# Patient Record
Sex: FEMALE | Race: WHITE | NOT HISPANIC OR LATINO | Employment: FULL TIME | URBAN - METROPOLITAN AREA
[De-identification: names, ages, dates, MRNs, and addresses within clinical notes are randomized per-mention and may not be internally consistent; named-entity substitution may affect disease eponyms.]

---

## 2019-10-06 ENCOUNTER — HOSPITAL ENCOUNTER (EMERGENCY)
Facility: HOSPITAL | Age: 29
Discharge: HOME | End: 2019-10-06
Attending: EMERGENCY MEDICINE | Admitting: EMERGENCY MEDICINE

## 2019-10-06 VITALS
HEIGHT: 67 IN | SYSTOLIC BLOOD PRESSURE: 121 MMHG | BODY MASS INDEX: 29.82 KG/M2 | HEART RATE: 96 BPM | WEIGHT: 190 LBS | RESPIRATION RATE: 16 BRPM | OXYGEN SATURATION: 99 % | DIASTOLIC BLOOD PRESSURE: 71 MMHG | TEMPERATURE: 97.9 F

## 2019-10-06 DIAGNOSIS — T50.901A ACCIDENTAL DRUG INGESTION, INITIAL ENCOUNTER: Primary | ICD-10-CM

## 2019-10-06 PROCEDURE — 99282 EMERGENCY DEPT VISIT SF MDM: CPT | Performed by: EMERGENCY MEDICINE

## 2019-10-06 PROCEDURE — 99284 EMERGENCY DEPT VISIT MOD MDM: CPT

## 2019-10-06 RX ADMIN — ACTIVATED CHARCOAL 50 G: 208 SUSPENSION ORAL at 21:00

## 2019-10-07 NOTE — ED PROVIDER NOTES
Subjective   History of Present Illness  History of Present Illness    Chief complaint: Accidental ingestion    Location: Home    Quality/Severity: Patient took 110 mg medroxyprogesterone by accident.    Timing/Onset/Duration: 8 PM    Modifying Factors: Nothing makes it better or worse    Associated Symptoms: No abdominal pain.  No bleeding.    Narrative: This 29-year-old female who is 4.5 weeks pregnant accidentally took 1 of her 10 mg medroxyprogesterone tonight at 8 PM.    OB/GYN: Dr. Hawk at [facility].      Review of Systems   Gastrointestinal: Negative for abdominal pain and nausea.   Genitourinary: Negative for vaginal bleeding.        Medication List      You have not been prescribed any medications.       No past medical history on file.    Allergies not on file    No past surgical history on file.    No family history on file.             Objective   Physical Exam   Constitutional: She is oriented to person, place, and time. She appears well-developed and well-nourished. She appears distressed (Patient appears anxious.).   ED Triage Vitals (10/06/19 2054)  Temp: 97.9 °F (36.6 °C)  Heart Rate: (!) 122  Resp: 20  BP: 160/92  SpO2: 100 %  Temp src: n/a  Heart Rate Source: n/a  Patient Position: n/a  BP Location: n/a  FiO2 (%): n/a    The patient's vitals were reviewed by me.  Unless otherwise noted they are within normal limits.  The patient is hypertensive with a blood pressure 160/92.  She is tachycardic with heart rate of 122.  She is anxious.     Abdominal: Soft. Bowel sounds are normal. She exhibits no distension and no mass. There is no tenderness. There is no rebound and no guarding. No hernia.   Neurological: She is alert and oriented to person, place, and time.   Nursing note and vitals reviewed.      Procedures           ED Course      8:53 PM, 10/06/19:  Poison control called and recommended symptomatic care and charcoal.  The were unsure about how long the patient should be  observed.  9:15 PM, 10/06/19:  I spoke with Dr. Hawk, the patient's OB/GYN.  He felt that the patient would not have any untoward effects from the medication that she accidentally took.  The patient should follow-up as scheduled with Dr. Hawk.    9:51 PM, 10/06/19:  The patient was reassessed.  She has no complaints.  Her heart rate is improved.  It is 103.  Her respiratory rate is 16.  Blood pressure 121/71, she is afebrile with sats of 98%.  Patient has no abdominal pain or vaginal bleeding.  Abdomen: Soft nontender no masses positive bowel sounds.    9:52 PM, 10/06/19:  The patient's diagnosis of accidental ingestion of medroxyprogesterone was discussed with her.  The patient should follow-up with Dr. Hawk as scheduled.  She should return to the emergency department if there is worsening pain, bleeding, worse in any way at all.  All the patient's questions were answered she was urged in good condition.          MDM    Final diagnoses:   Accidental drug ingestion, initial encounter              Ariel Hung MD  10/06/19 2113       Ariel Hung MD  10/06/19 8852

## 2019-10-07 NOTE — ED NOTES
Pt ingested provera po. No vaginal bleeding at this time. Will continue to monitor     Taylor Antonio, RN  10/06/19 2130       Taylor Antonio RN  10/06/19 2130

## 2019-10-07 NOTE — DISCHARGE INSTRUCTIONS
Follow-up with Dr. Hawk as scheduled.  Return to the emergency department there is worsening pain, bleeding, worse in any way at all.

## 2019-10-07 NOTE — ED NOTES
Patient sees Dr. Hawk @ [facility]. Patient has not seen Taylor Randall, RN  10/06/19 6357

## 2019-10-07 NOTE — ED NOTES
Continued absence of vaginal bleeding. Pt denies any c/o     Taylor Antonio, BROOKE  10/06/19 2048

## 2019-10-07 NOTE — ED NOTES
Spoke with Rita @ poison control. To be followed per Dr Hawk's request. Rita states that the [facility]  in agreeance     Taylor Antonio RN  10/06/19 6310

## 2022-09-11 ENCOUNTER — APPOINTMENT (OUTPATIENT)
Dept: GENERAL RADIOLOGY | Facility: HOSPITAL | Age: 32
End: 2022-09-11

## 2022-09-11 ENCOUNTER — HOSPITAL ENCOUNTER (EMERGENCY)
Facility: HOSPITAL | Age: 32
Discharge: HOME | End: 2022-09-11
Attending: EMERGENCY MEDICINE | Admitting: EMERGENCY MEDICINE

## 2022-09-11 VITALS
OXYGEN SATURATION: 99 % | BODY MASS INDEX: 31.39 KG/M2 | RESPIRATION RATE: 16 BRPM | HEART RATE: 68 BPM | SYSTOLIC BLOOD PRESSURE: 127 MMHG | HEIGHT: 67 IN | DIASTOLIC BLOOD PRESSURE: 79 MMHG | WEIGHT: 200 LBS | TEMPERATURE: 98.4 F

## 2022-09-11 DIAGNOSIS — S60.212A CONTUSION OF LEFT WRIST, INITIAL ENCOUNTER: Primary | ICD-10-CM

## 2022-09-11 PROCEDURE — 73110 X-RAY EXAM OF WRIST: CPT

## 2022-09-11 PROCEDURE — 99283 EMERGENCY DEPT VISIT LOW MDM: CPT

## 2022-09-11 NOTE — ED PROVIDER NOTES
"Subjective   PIT  History of Present Illness    · Chief complaint: Wrist injury    · Location: Dorsum of the left wrist    · Quality/Severity: Pain and redness    · Timing/Onset: Injury occurred at 11 AM (about 3 hours ago)    · Modifying Factors: Movement of the wrist exacerbates discomfort.    · Associated symptoms: Denies other injuries.  Denies numbness or weakness or loss of range of motion of the left hand and digits.    · Narrative: The patient is a 32-year-old white female that states a metal clamp fell approximately 14 feet and landed on top of her left wrist about 3 hours ago.  She says discomfort in the top of her left wrist since.      History provided by:  Patient    Past Medical History:   Diagnosis Date   • PCOS (polycystic ovarian syndrome)      /79 (BP Location: Right arm, Patient Position: Sitting)   Pulse 68   Temp 98.4 °F (36.9 °C) (Oral)   Resp 16   Ht 170.2 cm (67\")   Wt 90.7 kg (200 lb)   LMP  (LMP Unknown)   SpO2 99%   Breastfeeding No   BMI 31.32 kg/m²     Review of Systems   Constitutional: Negative for activity change, appetite change, chills and fever.   HENT: Negative for congestion, rhinorrhea, sinus pain and sore throat.    Eyes: Negative for pain and visual disturbance.   Respiratory: Negative for cough and shortness of breath.    Cardiovascular: Negative for chest pain.   Gastrointestinal: Negative for abdominal pain, diarrhea, nausea and vomiting.   Genitourinary: Negative for dysuria, flank pain and frequency.   Musculoskeletal: Negative for arthralgias, back pain, joint swelling, neck pain and neck stiffness.   Skin: Positive for color change ( Redness on the top of the left wrist.). Negative for rash.   Neurological: Negative for dizziness, weakness, numbness and headaches.       Past Medical History:   Diagnosis Date   • PCOS (polycystic ovarian syndrome)        Allergies   Allergen Reactions   • Penicillins Unknown (See Comments)     unk       History reviewed. " No pertinent surgical history.    History reviewed. No pertinent family history.    Social History     Socioeconomic History   • Marital status:    Tobacco Use   • Smoking status: Never Smoker   Substance and Sexual Activity   • Alcohol use: Never   • Drug use: Never   • Sexual activity: Never           Objective   Physical Exam  Vitals and nursing note reviewed.   Constitutional:       General: She is not in acute distress.     Appearance: Normal appearance. She is normal weight. She is not ill-appearing, toxic-appearing or diaphoretic.      Comments: The patient appears healthy in no acute distress.  Review of her vital signs: She is afebrile and all her vital signs are within normal limits.   HENT:      Head: Normocephalic and atraumatic.   Eyes:      General: No scleral icterus.     Conjunctiva/sclera: Conjunctivae normal.   Musculoskeletal:      Comments: There is no bony deformity of the left wrist.  She has some mild erythema and tenderness on the dorsum of the left wrist.  No tenderness or deformity of the left hand and digits which are neurovascular intact and have full range of motion.   Skin:     General: Skin is warm and dry.      Capillary Refill: Capillary refill takes less than 2 seconds.      Findings: Erythema ( Dorsum of left wrist) present. No rash.   Neurological:      General: No focal deficit present.      Mental Status: She is alert and oriented to person, place, and time.      Cranial Nerves: No cranial nerve deficit.      Sensory: No sensory deficit.      Motor: No weakness.   Psychiatric:         Mood and Affect: Mood normal.         Behavior: Behavior normal.         Thought Content: Thought content normal.         Judgment: Judgment normal.         Procedures           ED Course  ED Course as of 09/11/22 1607   Sun Sep 11, 2022   1401 The patient is x-ray of her left wrist was negative for fracture or osseous abnormality to mine and the radiologist interpretation. [TP]   1607 Is my  impression the patient has a contusion to the dorsum of the left wrist.  She was instructed to apply ice and rest the wrist for the next several days. [TP]      ED Course User Index  [TP] Marc Engle MD                                           MDM  Number of Diagnoses or Management Options  Contusion of left wrist, initial encounter: new and requires workup     Amount and/or Complexity of Data Reviewed  Tests in the radiology section of CPT®: ordered and reviewed    Risk of Complications, Morbidity, and/or Mortality  Presenting problems: moderate  Diagnostic procedures: moderate  Management options: moderate    Patient Progress  Patient progress: stable      Final diagnoses:   Contusion of left wrist, initial encounter       ED Disposition  ED Disposition     ED Disposition   Discharge    Condition   Stable    Comment   --             [facility]  [facility] [address]  [phone]  Call in 1 day  To establish a new primary care provider and for someone to follow-up with if not completely better in 2 weeks.         Medication List      No changes were made to your prescriptions during this visit.         Labs Reviewed - No data to display  XR Wrist 3+ View Left   Final Result   Negative left wrist.      Signer Name: Silvia York MD    Signed: 9/11/2022 12:42 PM    Workstation Name: [email or web address]     [facility]             Medication List      No changes were made to your prescriptions during this visit.              Marc Engle MD  09/11/22 4420